# Patient Record
Sex: MALE | Race: WHITE | NOT HISPANIC OR LATINO | Employment: FULL TIME | URBAN - METROPOLITAN AREA
[De-identification: names, ages, dates, MRNs, and addresses within clinical notes are randomized per-mention and may not be internally consistent; named-entity substitution may affect disease eponyms.]

---

## 2019-03-07 ENCOUNTER — HOSPITAL ENCOUNTER (EMERGENCY)
Facility: HOSPITAL | Age: 34
Discharge: HOME | End: 2019-03-07
Attending: EMERGENCY MEDICINE

## 2019-03-07 VITALS
DIASTOLIC BLOOD PRESSURE: 78 MMHG | WEIGHT: 175 LBS | RESPIRATION RATE: 14 BRPM | BODY MASS INDEX: 26.52 KG/M2 | TEMPERATURE: 99 F | HEART RATE: 76 BPM | HEIGHT: 68 IN | SYSTOLIC BLOOD PRESSURE: 121 MMHG | OXYGEN SATURATION: 100 %

## 2019-03-07 DIAGNOSIS — F19.10: Primary | ICD-10-CM

## 2019-03-07 PROCEDURE — 99283 EMERGENCY DEPT VISIT LOW MDM: CPT

## 2019-03-07 RX ORDER — BUPRENORPHINE 2 MG/1
2 TABLET SUBLINGUAL DAILY
COMMUNITY
End: 2022-04-11 | Stop reason: CLARIF

## 2019-03-07 RX ORDER — CLINDAMYCIN HYDROCHLORIDE 150 MG/1
300 CAPSULE ORAL 4 TIMES DAILY
Qty: 40 CAPSULE | Refills: 0 | Status: SHIPPED | OUTPATIENT
Start: 2019-03-07 | End: 2019-03-12
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
## 2019-03-07 NOTE — DISCHARGE INSTRUCTIONS
I do not think you have an infection on her spine at this time.  Since you do not have any neck pain fevers weakness or numbness currently it is very unlikely.  However if you have the symptoms you need to return to the emergency room immediately.  You also need to follow up with outpatient counseling for drug abuse.  [email or web address][employer] is a great website to find drug counselors, marriage counselors, and counselors for depression.

## 2022-04-11 ENCOUNTER — HOSPITAL ENCOUNTER (EMERGENCY)
Facility: HOSPITAL | Age: 37
Discharge: HOME | End: 2022-04-12
Attending: FAMILY MEDICINE

## 2022-04-11 DIAGNOSIS — R55 SYNCOPE: ICD-10-CM

## 2022-04-11 DIAGNOSIS — R07.81 RIB PAIN ON LEFT SIDE: Primary | ICD-10-CM

## 2022-04-11 DIAGNOSIS — E86.0 DEHYDRATION, MILD: ICD-10-CM

## 2022-04-11 DIAGNOSIS — R55 SYNCOPE AND COLLAPSE: ICD-10-CM

## 2022-04-11 DIAGNOSIS — R52 PAIN: ICD-10-CM

## 2022-04-11 LAB
ALBUMIN SERPL BCP-MCNC: 4 G/DL (ref 3.5–5.2)
ALP SERPL-CCNC: 52 U/L (ref 55–135)
ALT SERPL W/O P-5'-P-CCNC: 41 U/L (ref 10–44)
AMPHET+METHAMPHET UR QL: NEGATIVE
ANION GAP SERPL CALC-SCNC: 11 MMOL/L (ref 8–16)
AST SERPL-CCNC: 27 U/L (ref 10–40)
BARBITURATES UR QL SCN>200 NG/ML: NEGATIVE
BASOPHILS # BLD AUTO: 0.03 K/UL (ref 0–0.2)
BASOPHILS NFR BLD: 0.4 % (ref 0–1.9)
BENZODIAZ UR QL SCN>200 NG/ML: NEGATIVE
BILIRUB SERPL-MCNC: 0.2 MG/DL (ref 0.1–1)
BUN SERPL-MCNC: 18 MG/DL (ref 6–20)
BZE UR QL SCN: NEGATIVE
CALCIUM SERPL-MCNC: 9.6 MG/DL (ref 8.7–10.5)
CANNABINOIDS UR QL SCN: NEGATIVE
CHLORIDE SERPL-SCNC: 107 MMOL/L (ref 95–110)
CK MB SERPL-MCNC: 1.1 NG/ML (ref 0.1–6.5)
CK MB SERPL-RTO: 0.9 % (ref 0–5)
CK SERPL-CCNC: 117 U/L (ref 20–200)
CK SERPL-CCNC: 117 U/L (ref 20–200)
CO2 SERPL-SCNC: 24 MMOL/L (ref 23–29)
CREAT SERPL-MCNC: 1 MG/DL (ref 0.5–1.4)
CREAT UR-MCNC: 197.9 MG/DL (ref 23–375)
DIFFERENTIAL METHOD: ABNORMAL
EOSINOPHIL # BLD AUTO: 0 K/UL (ref 0–0.5)
EOSINOPHIL NFR BLD: 0 % (ref 0–8)
ERYTHROCYTE [DISTWIDTH] IN BLOOD BY AUTOMATED COUNT: 12.8 % (ref 11.5–14.5)
EST. GFR  (AFRICAN AMERICAN): >60 ML/MIN/1.73 M^2
EST. GFR  (NON AFRICAN AMERICAN): >60 ML/MIN/1.73 M^2
GLUCOSE SERPL-MCNC: 131 MG/DL (ref 70–110)
HCT VFR BLD AUTO: 38.5 % (ref 40–54)
HGB BLD-MCNC: 13.2 G/DL (ref 14–18)
IMM GRANULOCYTES # BLD AUTO: 0.01 K/UL (ref 0–0.04)
IMM GRANULOCYTES NFR BLD AUTO: 0.1 % (ref 0–0.5)
LYMPHOCYTES # BLD AUTO: 1.4 K/UL (ref 1–4.8)
LYMPHOCYTES NFR BLD: 19.4 % (ref 18–48)
MAGNESIUM SERPL-MCNC: 1.7 MG/DL (ref 1.6–2.6)
MCH RBC QN AUTO: 29.3 PG (ref 27–31)
MCHC RBC AUTO-ENTMCNC: 34.3 G/DL (ref 32–36)
MCV RBC AUTO: 86 FL (ref 82–98)
METHADONE UR QL SCN>300 NG/ML: NEGATIVE
MONOCYTES # BLD AUTO: 0.8 K/UL (ref 0.3–1)
MONOCYTES NFR BLD: 11.7 % (ref 4–15)
NEUTROPHILS # BLD AUTO: 4.9 K/UL (ref 1.8–7.7)
NEUTROPHILS NFR BLD: 68.4 % (ref 38–73)
NRBC BLD-RTO: 0 /100 WBC
OPIATES UR QL SCN: NEGATIVE
PCP UR QL SCN>25 NG/ML: NEGATIVE
PLATELET # BLD AUTO: 212 K/UL (ref 150–450)
PMV BLD AUTO: 9.8 FL (ref 9.2–12.9)
POTASSIUM SERPL-SCNC: 4.2 MMOL/L (ref 3.5–5.1)
PROT SERPL-MCNC: 7.2 G/DL (ref 6–8.4)
RBC # BLD AUTO: 4.5 M/UL (ref 4.6–6.2)
SODIUM SERPL-SCNC: 142 MMOL/L (ref 136–145)
TOXICOLOGY INFORMATION: NORMAL
TROPONIN I SERPL DL<=0.01 NG/ML-MCNC: <0.006 NG/ML (ref 0–0.03)
TSH SERPL DL<=0.005 MIU/L-ACNC: 1.48 UIU/ML (ref 0.4–4)
WBC # BLD AUTO: 7.11 K/UL (ref 3.9–12.7)

## 2022-04-11 PROCEDURE — 70450 CT HEAD WITHOUT CONTRAST: ICD-10-PCS | Mod: 26,,, | Performed by: RADIOLOGY

## 2022-04-11 PROCEDURE — 70450 CT HEAD/BRAIN W/O DYE: CPT | Mod: 26,,, | Performed by: RADIOLOGY

## 2022-04-11 PROCEDURE — 83735 ASSAY OF MAGNESIUM: CPT | Performed by: FAMILY MEDICINE

## 2022-04-11 PROCEDURE — 93010 EKG 12-LEAD: ICD-10-PCS | Mod: ,,, | Performed by: INTERNAL MEDICINE

## 2022-04-11 PROCEDURE — 82553 CREATINE MB FRACTION: CPT | Performed by: FAMILY MEDICINE

## 2022-04-11 PROCEDURE — 85025 COMPLETE CBC W/AUTO DIFF WBC: CPT | Performed by: FAMILY MEDICINE

## 2022-04-11 PROCEDURE — 71100 X-RAY EXAM RIBS UNI 2 VIEWS: CPT | Mod: 26,LT,, | Performed by: RADIOLOGY

## 2022-04-11 PROCEDURE — 84484 ASSAY OF TROPONIN QUANT: CPT | Performed by: FAMILY MEDICINE

## 2022-04-11 PROCEDURE — 93005 ELECTROCARDIOGRAM TRACING: CPT

## 2022-04-11 PROCEDURE — 80053 COMPREHEN METABOLIC PANEL: CPT | Performed by: FAMILY MEDICINE

## 2022-04-11 PROCEDURE — 71100 X-RAY EXAM RIBS UNI 2 VIEWS: CPT | Mod: 26,RT,, | Performed by: RADIOLOGY

## 2022-04-11 PROCEDURE — 84443 ASSAY THYROID STIM HORMONE: CPT | Performed by: FAMILY MEDICINE

## 2022-04-11 PROCEDURE — 71100 XR RIBS 2 VIEW RIGHT: ICD-10-PCS | Mod: 26,RT,, | Performed by: RADIOLOGY

## 2022-04-11 PROCEDURE — 71045 XR CHEST AP PORTABLE: ICD-10-PCS | Mod: 26,,, | Performed by: RADIOLOGY

## 2022-04-11 PROCEDURE — 96360 HYDRATION IV INFUSION INIT: CPT

## 2022-04-11 PROCEDURE — 71045 X-RAY EXAM CHEST 1 VIEW: CPT | Mod: TC,FY

## 2022-04-11 PROCEDURE — 99285 EMERGENCY DEPT VISIT HI MDM: CPT | Mod: 25

## 2022-04-11 PROCEDURE — 70450 CT HEAD/BRAIN W/O DYE: CPT | Mod: TC

## 2022-04-11 PROCEDURE — 93010 ELECTROCARDIOGRAM REPORT: CPT | Mod: ,,, | Performed by: INTERNAL MEDICINE

## 2022-04-11 PROCEDURE — 71100 X-RAY EXAM RIBS UNI 2 VIEWS: CPT | Mod: TC,FY,LT

## 2022-04-11 PROCEDURE — 71100 X-RAY EXAM RIBS UNI 2 VIEWS: CPT | Mod: TC,FY,RT

## 2022-04-11 PROCEDURE — 80307 DRUG TEST PRSMV CHEM ANLYZR: CPT | Performed by: FAMILY MEDICINE

## 2022-04-11 PROCEDURE — 71045 X-RAY EXAM CHEST 1 VIEW: CPT | Mod: 26,,, | Performed by: RADIOLOGY

## 2022-04-12 VITALS
TEMPERATURE: 98 F | HEIGHT: 66 IN | OXYGEN SATURATION: 100 % | DIASTOLIC BLOOD PRESSURE: 78 MMHG | HEART RATE: 101 BPM | WEIGHT: 160 LBS | SYSTOLIC BLOOD PRESSURE: 121 MMHG | BODY MASS INDEX: 25.71 KG/M2 | RESPIRATION RATE: 18 BRPM

## 2022-04-12 PROCEDURE — 25000003 PHARM REV CODE 250: Performed by: FAMILY MEDICINE

## 2022-04-12 RX ADMIN — SODIUM CHLORIDE 1000 ML: 0.9 INJECTION, SOLUTION INTRAVENOUS at 12:04

## 2022-04-12 NOTE — DISCHARGE INSTRUCTIONS
Please follow-up with family medicine physician, referral has been made to establish primary care as well as follow-up for this recent syncopal episode.  Return to ER as needed.

## 2022-04-12 NOTE — ED NOTES
1 attempt by jared ARGUETA  2 attempts by ELLIE Peralta  2 attempts by ELLIE FONSECA   For IV not successful

## 2022-04-12 NOTE — ED NOTES
"Pt states "I was watching basketball yesterday and jumped up and I woke up in the floor. That's where all the cuts are from."   "

## 2022-04-12 NOTE — ED PROVIDER NOTES
Encounter Date: 4/11/2022       History     Chief Complaint   Patient presents with    Loss of Consciousness     Pt reports having syncopal episode while in [facility] yesterday. Pt was released from [facility] today and walked to the hospital.  Pt to ed complaining of head and neck pain.      Patient comes our facility with reported syncopal episode yesterday.  Patient stated that he was watching a basketball game in stood up suddenly.  Patient does not remember anything until he awoke unknown amount of time later.  Patient states that he has had, left shoulder and left rib pain after the incident.  Patient was told that he fell and hit floor with his head.  Patient was released from [facility] today came to ER to be evaluated.  Patient has had near syncopal episodes that he is associated with anxiety in the past.  Patient denies any preceding symptoms that would include shortness of breath, chest pain, headache prior to the syncopal episode.         Review of patient's allergies indicates:   Allergen Reactions    Sulfa (sulfonamide antibiotics)      History reviewed. No pertinent past medical history.  History reviewed. No pertinent surgical history.  History reviewed. No pertinent family history.  Social History     Tobacco Use    Smoking status: Current Every Day Smoker     Packs/day: 1.00     Types: Cigarettes, Vaping with nicotine    Smokeless tobacco: Never Used   Substance Use Topics    Alcohol use: Never    Drug use: Yes     Types: Methamphetamines     Review of Systems   Cardiovascular: Positive for chest pain (Right posterior ribs).   Musculoskeletal: Positive for myalgias (Left rhomboid muscle surrounding left scapula). Negative for arthralgias, back pain, joint swelling, neck pain and neck stiffness.   Skin:        Multiple abrasions to scalp, and left shoulder.   Neurological: Positive for syncope and headaches.   All other systems reviewed and are negative.      Physical Exam     Initial Vitals [04/11/22 2109]    BP Pulse Resp Temp SpO2   110/75 (!) 133 20 98.4 °F (36.9 °C) 100 %      MAP       --         Physical Exam    Nursing note and vitals reviewed.  Constitutional: He appears well-developed and well-nourished. He is not diaphoretic. No distress.   HENT:   Head: Normocephalic and atraumatic.   Nose: Nose normal.   Eyes: Conjunctivae and EOM are normal. Pupils are equal, round, and reactive to light. Right eye exhibits no discharge. Left eye exhibits no discharge. No scleral icterus.   Neck: Neck supple. No thyromegaly present.   Normal range of motion.  Cardiovascular: Regular rhythm, normal heart sounds and intact distal pulses.   No murmur heard.  Tachycardic pulse 120's   Pulmonary/Chest: Breath sounds normal. No respiratory distress.   Abdominal: Abdomen is soft. Bowel sounds are normal. He exhibits no distension. There is no abdominal tenderness.   Musculoskeletal:         General: Tenderness present. No edema. Normal range of motion.      Cervical back: Normal range of motion and neck supple.      Comments: Patient is tender over his left posterior ribs without deformity or crepitus.  Patient has tenderness overlying his left infraspinatus, supraspinatus, and rhomboid muscles.  No deformities were noted.  Abrasions noted to the left shoulder.     Lymphadenopathy:     He has no cervical adenopathy.   Neurological: He is alert and oriented to person, place, and time. He has normal strength. No cranial nerve deficit or sensory deficit. GCS score is 15. GCS eye subscore is 4. GCS verbal subscore is 5. GCS motor subscore is 6.   Skin: Skin is warm and dry. Capillary refill takes less than 2 seconds. No rash noted. No erythema.   Multiple abrasions to scalp and trunk   Psychiatric: He has a normal mood and affect. His behavior is normal. Judgment and thought content normal.         ED Course   Procedures  Labs Reviewed   COMPREHENSIVE METABOLIC PANEL - Abnormal; Notable for the following components:       Result  Value    Glucose 131 (*)     Alkaline Phosphatase 52 (*)     All other components within normal limits   CBC W/ AUTO DIFFERENTIAL - Abnormal; Notable for the following components:    RBC 4.50 (*)     Hemoglobin 13.2 (*)     Hematocrit 38.5 (*)     All other components within normal limits   CK   CK-MB   TROPONIN I   TSH   MAGNESIUM   DRUG SCREEN PANEL, URINE EMERGENCY    Narrative:     Specimen Source->Urine     EKG Readings: (Independently Interpreted)   Sinus tachycardia with ventricular rate of 120. No ST elevations or depressions.  No inverted T-waves.         Imaging Results          CT Head Without Contrast (In process)                X-Ray Ribs 2 View Left (In process)                X-Ray Chest AP Portable (In process)  Result time 04/11/22 21:32:58               X-Ray Ribs 2 View Right (In process)               X-Rays:   Independently Interpreted Readings:   Other Readings:  Chest x-ray shows no acute findings.    X-ray of ribs to include left and right side show no acute fractures or dislocation of ribs.    Head CT:    PROCEDURE INFORMATION:  Exam: CT Head Without Contrast  Exam date and time: 4/11/2022 11:18 PM  Age: 36 years old  Clinical indication: Injury or trauma; Fall; Abrasion; Head, generalized  TECHNIQUE:  Imaging protocol: Computed tomography of the head without contrast.  COMPARISON:  No relevant prior studies available.  FINDINGS:  Brain: Normal. No hemorrhage. Unremarkable white matter. No mass effect.  Cerebral ventricles: No ventriculomegaly.  Paranasal sinuses: Visualized sinuses are unremarkable. No fluid levels.  Mastoid air cells: Visualized mastoid air cells are well aerated.  Bones/joints: Unremarkable. No acute fracture.  Soft tissues: Unremarkable.  IMPRESSION:  No evidence of an acute intracranial abnormality.  Thank you for allowing us to participate in the care of your patient.  Dictated and Authenticated by: Danie Oconnor MD  04/12/2022 1:05 AM Central Time ([place] &  [place]    Medications   sodium chloride 0.9% bolus 1,000 mL (0 mLs Intravenous Stopped 4/12/22 0101)     Medical Decision Making:   ED Management:  Patient comes our facility with complaints of a syncopal episode yesterday.  Patient has had no further symptoms since.  Patient declining have a head injury as well as posterior left rib pain.  X-rays of chest and rib showed no acute fractures.  Patient was tachycardic initially.  IV fluids were given the patient.  Labs did indicate some mild dehydration, IV fluids were given the patient in ER..  Orthostatics were taken and patient showed no orthostasis.  CT of head showed no acute findings.  Patient was discharged home.  Follow-up with family medicine, referral was made.                        Clinical Impression:   Final diagnoses:  [R55] Syncope  [R55] Syncope and collapse  [R52] Pain  [R07.81] Rib pain on left side (Primary)  [E86.0] Dehydration, mild          ED Disposition Condition    Discharge Stable        ED Prescriptions     None        Follow-up Information    None     Follow-up with family medicine, referral has been made to establish primary care.  Return to ER as needed.     Cornell Christine MD  04/12/22 0115

## 2025-02-10 ENCOUNTER — HOSPITAL ENCOUNTER (EMERGENCY)
Facility: HOSPITAL | Age: 40
Discharge: HOME | End: 2025-02-10
Attending: STUDENT IN AN ORGANIZED HEALTH CARE EDUCATION/TRAINING PROGRAM

## 2025-02-10 VITALS
SYSTOLIC BLOOD PRESSURE: 143 MMHG | HEIGHT: 67 IN | RESPIRATION RATE: 20 BRPM | HEART RATE: 90 BPM | BODY MASS INDEX: 32.96 KG/M2 | WEIGHT: 210 LBS | DIASTOLIC BLOOD PRESSURE: 87 MMHG | TEMPERATURE: 99 F | OXYGEN SATURATION: 97 %

## 2025-02-10 DIAGNOSIS — S62.619A CLOSED AVULSION FRACTURE OF PROXIMAL PHALANX OF FINGER, INITIAL ENCOUNTER: ICD-10-CM

## 2025-02-10 DIAGNOSIS — S63.286A DISLOCATION OF PROXIMAL INTERPHALANGEAL JOINT OF RIGHT LITTLE FINGER, INITIAL ENCOUNTER: Primary | ICD-10-CM

## 2025-02-10 PROCEDURE — 26770 TREAT FINGER DISLOCATION: CPT | Mod: F9

## 2025-02-10 PROCEDURE — 99285 EMERGENCY DEPT VISIT HI MDM: CPT | Mod: 25

## 2025-02-10 PROCEDURE — 73140 X-RAY EXAM OF FINGER(S): CPT | Mod: TC,RT

## 2025-02-10 PROCEDURE — 73130 X-RAY EXAM OF HAND: CPT | Mod: TC,RT

## 2025-02-10 PROCEDURE — 96372 THER/PROPH/DIAG INJ SC/IM: CPT | Performed by: NURSE PRACTITIONER

## 2025-02-10 PROCEDURE — 73140 X-RAY EXAM OF FINGER(S): CPT | Mod: 26,59,RT, | Performed by: RADIOLOGY

## 2025-02-10 PROCEDURE — 63600175 PHARM REV CODE 636 W HCPCS: Mod: JZ,TB | Performed by: NURSE PRACTITIONER

## 2025-02-10 PROCEDURE — 73130 X-RAY EXAM OF HAND: CPT | Mod: 26,RT,, | Performed by: RADIOLOGY

## 2025-02-10 RX ORDER — KETOROLAC TROMETHAMINE 30 MG/ML
60 INJECTION, SOLUTION INTRAMUSCULAR; INTRAVENOUS
Status: COMPLETED | OUTPATIENT
Start: 2025-02-10 | End: 2025-02-10

## 2025-02-10 RX ORDER — IBUPROFEN 800 MG/1
800 TABLET ORAL EVERY 6 HOURS PRN
Qty: 20 TABLET | Refills: 0 | Status: SHIPPED | OUTPATIENT
Start: 2025-02-10

## 2025-02-10 RX ADMIN — KETOROLAC TROMETHAMINE 60 MG: 30 INJECTION, SOLUTION INTRAMUSCULAR; INTRAVENOUS at 08:02

## 2025-02-11 ENCOUNTER — TELEPHONE (OUTPATIENT)
Dept: ADMINISTRATIVE | Facility: OTHER | Age: 40
End: 2025-02-11

## 2025-02-11 NOTE — ED PROVIDER NOTES
"CHIEF COMPLAINT  Chief Complaint   Patient presents with    Hand Injury     PT fell last night and c/o right lateral hand pain.       HISTORY OF PRESENT ILLNESS  Aroldo Rock is a 39 y.o. male  presents to ER for evaluation of right little finger injury. He states he fell, hit his right hand to something he could not recall, noticed dislocation of PIP joint of right little finger last night, feared putting it back in placed.  No other specific aggravating or relieving factors otherwise.      PAST MEDICAL HISTORY  History reviewed. No pertinent past medical history.    CURRENT MEDICATIONS    No current facility-administered medications for this encounter.    Current Outpatient Medications:     ibuprofen (ADVIL,MOTRIN) 800 MG tablet, Take 1 tablet (800 mg total) by mouth every 6 (six) hours as needed for Pain., Disp: 20 tablet, Rfl: 0    ALLERGIES    Review of patient's allergies indicates:   Allergen Reactions    Sulfa (sulfonamide antibiotics)        SURGICAL HISTORY    History reviewed. No pertinent surgical history.    SOCIAL HISTORY    Social History     Socioeconomic History    Marital status:    Tobacco Use    Smoking status: Every Day     Types: Vaping with nicotine    Smokeless tobacco: Never   Substance and Sexual Activity    Alcohol use: Yes     Comment: on weekends    Drug use: Not Currently     Types: Methamphetamines     Comment: 3 years sober       FAMILY HISTORY    No family history on file.    REVIEW OF SYSTEMS    Review of Systems   Musculoskeletal:  Positive for falls and joint pain.     All other systems reviewed and are negative    VITAL SIGNS:   BP (!) 143/87 (BP Location: Left arm)   Pulse 90   Temp 98.7 °F (37.1 °C) (Oral)   Resp 20   Ht 5' 7" (1.702 m)   Wt 95.3 kg (210 lb)   SpO2 97%   BMI 32.89 kg/m²      Physical Exam  Constitutional:       Appearance: Normal appearance.   Cardiovascular:      Rate and Rhythm: Normal rate.   Pulmonary:      Effort: Pulmonary effort is " normal.   Musculoskeletal:      Right hand: Swelling and deformity (right little finger) present. No lacerations. Normal capillary refill. Normal pulse.   Skin:     Capillary Refill: Capillary refill takes less than 2 seconds.   Neurological:      General: No focal deficit present.      Mental Status: He is alert.   Psychiatric:         Mood and Affect: Mood normal.       Vitals and nursing note reviewed.     LABS    Labs Reviewed   HEPATITIS C ANTIBODY   HIV 1 / 2 ANTIBODY         EKG      RADIOLOGY    X-Ray Finger 2 or More Views Right   Final Result      As above.         Electronically signed by: Henry Rivera MD   Date:    02/10/2025   Time:    20:31      X-Ray Hand 3 View Right   Final Result      As above.         Electronically signed by: Henry Rivera MD   Date:    02/10/2025   Time:    20:30            PROCEDURES    Orthopedic Injury    Date/Time: 2/10/2025 8:48 PM    Performed by: Sukh Lockett NP  Authorized by: Kolton Ley MD    Location procedure was performed:  [facility] EMERGENCY DEPARTMENT  Consent Done?:  Yes  Universal Protocol:     Verbal consent obtained?: Yes      Risks and benefits: Risks, benefits and alternatives were discussed      Consent given by:  Patient    Patient states understanding of procedure being performed: Yes      Patient identity confirmed:  Verbally with patient  Injury:     Injury location:  Finger    Location details:  Right little finger    Injury type:  Dislocation    Dislocation type: PIP        Pre-procedure assessment:     Neurovascular status: Neurovascularly intact      Distal perfusion: normal      Neurological function: normal      Range of motion: reduced        Selections made in this section will also lock the Injury type section above.:     Manipulation performed?: Yes      Reduction successful?: Yes      Confirmation: Reduction confirmed by x-ray      Immobilization:  Splint    Splint type:  Static finger    Supplies used:  Elastic  bandage  Post-procedure assessment:     Neurovascular status: Neurovascularly intact      Distal perfusion: normal      Neurological function: normal      Range of motion: normal      Patient tolerance:  Patient tolerated the procedure well with no immediate complications      Medications   ketorolac injection 60 mg (60 mg Intramuscular Given 2/10/25 2042)                Medical Decision Making  Aroldo Rock is a 39 y.o. male  presents to ER for evaluation of right little finger injury. He states he fell, hit his right hand to something he could not recall, noticed dislocation of PIP joint of right little finger last night, feared putting it back in placed.  No other specific aggravating or relieving factors otherwise.  Ddx:Fracture, strain, sprain, tendonitis, dislocation  Using ice water, effect of anesthesia of right hand, applying minimal pulling middle phalanx, put it back in place   Finger splint applied  Patient was advised to follow up with ortho/hand surgeon       Problems Addressed:  Closed avulsion fracture of proximal phalanx of finger, initial encounter: acute illness or injury  Dislocation of proximal interphalangeal joint of right little finger, initial encounter: acute illness or injury    Amount and/or Complexity of Data Reviewed  Radiology: ordered. Decision-making details documented in ED Course.    Risk  Prescription drug management.  Minor surgery with identified risk factors.           Discharge Medication List as of 2/10/2025  8:55 PM          Discharge Medication List as of 2/10/2025  8:55 PM        START taking these medications    Details   ibuprofen (ADVIL,MOTRIN) 800 MG tablet Take 1 tablet (800 mg total) by mouth every 6 (six) hours as needed for Pain., Starting Mon 2/10/2025, Normal             I discussed with patient and/or family/caretaker that evaluation in the ED does not suggest any emergent or life threatening medical condition requiring immediate intervention beyond what  was provided in the ED, and I believe the patient is safe for discharge.  Regardless, an unremarkable evaluation in the ED does not preclude the development or presence of a serious or life threatening condition. As such, patient was instructed to return immediately for any worsening or change in current symptoms  Patient agrees with plan of care.    DISPOSITION  Patient discharged to home in stable condition.        FINAL IMPRESSION    1. Dislocation of proximal interphalangeal joint of right little finger, initial encounter    2. Closed avulsion fracture of proximal phalanx of finger, initial encounter         Sukh Lockett NP  02/10/25 1000

## 2025-02-11 NOTE — DISCHARGE INSTRUCTIONS
Take the medications as prescribed. Rest ice, splint elevate your hand. Return for any worsening or new symptoms. Follow up with orthopedist

## 2025-06-11 ENCOUNTER — HOSPITAL ENCOUNTER (EMERGENCY)
Facility: HOSPITAL | Age: 40
Discharge: HOME | End: 2025-06-11
Attending: EMERGENCY MEDICINE

## 2025-06-11 VITALS
TEMPERATURE: 98 F | HEART RATE: 84 BPM | SYSTOLIC BLOOD PRESSURE: 128 MMHG | BODY MASS INDEX: 31.08 KG/M2 | DIASTOLIC BLOOD PRESSURE: 84 MMHG | HEIGHT: 67 IN | RESPIRATION RATE: 20 BRPM | WEIGHT: 198 LBS | OXYGEN SATURATION: 98 %

## 2025-06-11 DIAGNOSIS — R21 RASH AND NONSPECIFIC SKIN ERUPTION: Primary | ICD-10-CM

## 2025-06-11 DIAGNOSIS — L25.9 CONTACT DERMATITIS, UNSPECIFIED CONTACT DERMATITIS TYPE, UNSPECIFIED TRIGGER: ICD-10-CM

## 2025-06-11 PROCEDURE — 63600175 PHARM REV CODE 636 W HCPCS: Performed by: EMERGENCY MEDICINE

## 2025-06-11 PROCEDURE — 99284 EMERGENCY DEPT VISIT MOD MDM: CPT | Mod: 25

## 2025-06-11 PROCEDURE — 96372 THER/PROPH/DIAG INJ SC/IM: CPT | Performed by: EMERGENCY MEDICINE

## 2025-06-11 RX ORDER — DEXAMETHASONE SODIUM PHOSPHATE 10 MG/ML
10 INJECTION, SOLUTION INTRA-ARTICULAR; INTRALESIONAL; INTRAMUSCULAR; INTRAVENOUS; SOFT TISSUE
Status: COMPLETED | OUTPATIENT
Start: 2025-06-11 | End: 2025-06-11

## 2025-06-11 RX ORDER — METHYLPREDNISOLONE 4 MG/1
TABLET ORAL
Qty: 1 EACH | Refills: 0 | Status: SHIPPED | OUTPATIENT
Start: 2025-06-11

## 2025-06-11 RX ADMIN — DEXAMETHASONE SODIUM PHOSPHATE 10 MG: 10 INJECTION INTRAMUSCULAR; INTRAVENOUS at 08:06

## 2025-06-12 NOTE — ED PROVIDER NOTES
Encounter Date: 6/11/2025       History     Chief Complaint   Patient presents with    Rash     Poison IVY to legs and arms. Itching.     39-year-old male, here from home via private vehicle for evaluation and treatment of what he believes is a poison ivy rash on his legs and arms.  He states that about 5 days ago he was clearing some land and believes he came into contact with poison ivy.  He has had similar rashes in the past.  Denies any oropharyngeal involvement.  He has been using calamine lotion without significant relief.      Review of patient's allergies indicates:   Allergen Reactions    Sulfa (sulfonamide antibiotics)      History reviewed. No pertinent past medical history.  History reviewed. No pertinent surgical history.  No family history on file.  Social History[1]  Review of Systems   Skin:  Positive for rash.   All other systems reviewed and are negative.      Physical Exam     Initial Vitals [06/11/25 2022]   BP Pulse Resp Temp SpO2   131/82 81 20 98.2 °F (36.8 °C) 97 %      MAP       --         Physical Exam    Nursing note and vitals reviewed.  Constitutional: He appears well-developed and well-nourished. He is not diaphoretic. No distress.   HENT:   Head: Normocephalic and atraumatic.   Nose: Nose normal. Mouth/Throat: Oropharynx is clear and moist. No oropharyngeal exudate.   Eyes: Conjunctivae and EOM are normal. Pupils are equal, round, and reactive to light. No scleral icterus.   Neck: Neck supple. No JVD present.   Normal range of motion.  Cardiovascular:  Normal rate, regular rhythm, normal heart sounds and intact distal pulses.           No murmur heard.  Pulmonary/Chest: Breath sounds normal. No stridor. No respiratory distress. He has no wheezes. He has no rhonchi. He has no rales.   Abdominal: Abdomen is soft. Bowel sounds are normal. He exhibits no distension. There is no abdominal tenderness.   Musculoskeletal:         General: No tenderness or edema. Normal range of motion.       Cervical back: Normal range of motion and neck supple.     Neurological: He is alert and oriented to person, place, and time. He has normal strength. No cranial nerve deficit or sensory deficit. GCS score is 15. GCS eye subscore is 4. GCS verbal subscore is 5. GCS motor subscore is 6.   Skin: Skin is warm and dry. Capillary refill takes less than 2 seconds. Rash noted. No erythema.   On the patient's legs, and to a lesser extent on his arms, his is a rash consistent with poison ivy rash.  A few the areas are excoriated but none appear to be infected.   Psychiatric: He has a normal mood and affect. His behavior is normal.         ED Course   Procedures  Labs Reviewed - No data to display       Imaging Results    None          Medications   dexAMETHasone sodium phos (PF) 10 mg/mL injection 10 mg (10 mg Intramuscular Given 6/11/25 2046)     Medical Decision Making  Differential includes contact dermatitis, viral exanthem, urticarial rash, etc.    Patient's rash is consistent with a contact dermatitis from poison ivy.  Will give Decadron injection here tonight, and discharge home with a Medrol Dosepak.  He will continue to use topical treatments, and will start taking Benadryl orally.  He will follow-up with his PCP, and return here as needed or if worse in any way.    Risk  Prescription drug management.                                      Clinical Impression:  Final diagnoses:  [R21] Rash and nonspecific skin eruption (Primary)  [L25.9] Contact dermatitis, unspecified contact dermatitis type, unspecified trigger          ED Disposition Condition    Discharge Stable          ED Prescriptions       Medication Sig Dispense Start Date End Date Auth. Provider    methylPREDNISolone (MEDROL DOSEPACK) 4 mg tablet Take as directed 1 each 6/11/2025 -- Binu Marrufo MD          Follow-up Information       Follow up With Specialties Details Why Contact Info    Your primary care provider  Schedule an appointment as soon as  possible for a visit       [facility] Emergency Medicine  As needed, If symptoms worsen [address] [facility] [id number] [phone]                   [1]   Social History  Tobacco Use    Smoking status: Every Day     Types: Vaping with nicotine    Smokeless tobacco: Never   Substance Use Topics    Alcohol use: Yes     Comment: on weekends    Drug use: Not Currently     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

## 2025-06-12 NOTE — DISCHARGE INSTRUCTIONS
As we discussed, take Medrol Dosepak as prescribed, and also use Benadryl oral tablets/capsules and Benadryl anti-itch cream.  Follow-up with your primary care doctor, return here as needed or if worse in any way.